# Patient Record
Sex: FEMALE | Race: WHITE | NOT HISPANIC OR LATINO | URBAN - METROPOLITAN AREA
[De-identification: names, ages, dates, MRNs, and addresses within clinical notes are randomized per-mention and may not be internally consistent; named-entity substitution may affect disease eponyms.]

---

## 2018-07-03 ENCOUNTER — NEW PATIENT (OUTPATIENT)
Dept: URBAN - METROPOLITAN AREA CLINIC 56 | Facility: CLINIC | Age: 68
End: 2018-07-03
Payer: MEDICARE

## 2018-07-03 DIAGNOSIS — E11.3293 DIABETES MELLITUS TYPE 2 WITH MILD NON-PROLIFERATI: ICD-10-CM

## 2018-07-03 DIAGNOSIS — H35.373 PUCKERING OF MACULA, BILATERAL: ICD-10-CM

## 2018-07-03 DIAGNOSIS — H43.813 VITREOUS DEGENERATION, BILATERAL: ICD-10-CM

## 2018-07-03 DIAGNOSIS — Z96.1 PRESENCE OF INTRAOCULAR LENS: ICD-10-CM

## 2018-07-03 PROCEDURE — 92250 FUNDUS PHOTOGRAPHY W/I&R: CPT | Performed by: OPTOMETRIST

## 2018-07-03 PROCEDURE — 92004 COMPRE OPH EXAM NEW PT 1/>: CPT | Performed by: OPTOMETRIST

## 2018-07-03 ASSESSMENT — VISUAL ACUITY
OS: 20/20
OD: 20/20

## 2018-07-03 ASSESSMENT — INTRAOCULAR PRESSURE
OS: 14
OD: 14

## 2018-07-03 ASSESSMENT — KERATOMETRY
OD: 44.09
OS: 43.70

## 2019-06-12 ENCOUNTER — FOLLOW UP ESTABLISHED (OUTPATIENT)
Dept: URBAN - METROPOLITAN AREA CLINIC 56 | Facility: CLINIC | Age: 69
End: 2019-06-12
Payer: MEDICARE

## 2019-06-12 DIAGNOSIS — H52.4 PRESBYOPIA: ICD-10-CM

## 2019-06-12 PROCEDURE — 92014 COMPRE OPH EXAM EST PT 1/>: CPT | Performed by: OPTOMETRIST

## 2019-06-12 PROCEDURE — 92134 CPTRZ OPH DX IMG PST SGM RTA: CPT | Performed by: OPTOMETRIST

## 2019-06-12 ASSESSMENT — VISUAL ACUITY
OS: 20/20
OD: 20/20

## 2019-06-12 ASSESSMENT — INTRAOCULAR PRESSURE
OS: 14
OD: 14

## 2019-06-12 ASSESSMENT — KERATOMETRY
OS: 43.90
OD: 44.12

## 2021-03-04 ENCOUNTER — FOLLOW UP ESTABLISHED (OUTPATIENT)
Dept: URBAN - METROPOLITAN AREA CLINIC 56 | Facility: CLINIC | Age: 71
End: 2021-03-04
Payer: MEDICARE

## 2021-03-04 DIAGNOSIS — E11.3393 DIABETES MELLITUS TYPE 2 WITH MODERATE NON-PROLIFE: ICD-10-CM

## 2021-03-04 DIAGNOSIS — Z79.84 LONG TERM (CURRENT) USE OF ORAL ANTIDIABETIC DRUGS: ICD-10-CM

## 2021-03-04 PROCEDURE — 92250 FUNDUS PHOTOGRAPHY W/I&R: CPT | Performed by: OPTOMETRIST

## 2021-03-04 PROCEDURE — 99214 OFFICE O/P EST MOD 30 MIN: CPT | Performed by: OPTOMETRIST

## 2021-03-04 ASSESSMENT — VISUAL ACUITY
OS: 20/25
OD: 20/25

## 2021-03-04 ASSESSMENT — KERATOMETRY
OD: 44.20
OS: 43.90

## 2021-03-04 ASSESSMENT — INTRAOCULAR PRESSURE
OD: 14
OS: 14

## 2021-04-21 ENCOUNTER — OFFICE VISIT (OUTPATIENT)
Dept: URBAN - METROPOLITAN AREA CLINIC 56 | Facility: CLINIC | Age: 71
End: 2021-04-21
Payer: MEDICARE

## 2021-04-21 DIAGNOSIS — H26.493 OTHER SECONDARY CATARACT, BILATERAL: ICD-10-CM

## 2021-04-21 DIAGNOSIS — E11.39 TYPE 2 DIABETES MELLITUS WITH OTHER DIABETIC OPHTHALMIC COMPLICATION: ICD-10-CM

## 2021-04-21 PROCEDURE — 92004 COMPRE OPH EXAM NEW PT 1/>: CPT | Performed by: OPHTHALMOLOGY

## 2021-04-21 PROCEDURE — 92235 FLUORESCEIN ANGRPH MLTIFRAME: CPT | Performed by: OPHTHALMOLOGY

## 2021-04-21 PROCEDURE — 92134 CPTRZ OPH DX IMG PST SGM RTA: CPT | Performed by: OPHTHALMOLOGY

## 2021-04-21 ASSESSMENT — INTRAOCULAR PRESSURE
OS: 13
OD: 12

## 2021-04-21 NOTE — IMPRESSION/PLAN
Impression: Type 2 diab w mild nonprlf diabetic rtnop w/o macular edema, bilateral: Q35.0289.  Plan: Mild NDPR with no DME OU - observe and review in 4 months, sooner PRN

## 2021-04-21 NOTE — IMPRESSION/PLAN
Impression: Type 2 diabetes mellitus with other diabetic ophthalmic complication: P04.87.  Plan: Stressed the importance compliance, blood glucose, and blood pressure control in preventing progression of retinopathy and/or maculopathy and potentially irreversible vision loss and blindness

## 2022-11-17 ENCOUNTER — OFFICE VISIT (OUTPATIENT)
Dept: URBAN - METROPOLITAN AREA CLINIC 56 | Facility: LOCATION | Age: 72
End: 2022-11-17
Payer: MEDICARE

## 2022-11-17 DIAGNOSIS — H43.813 VITREOUS DEGENERATION, BILATERAL: ICD-10-CM

## 2022-11-17 DIAGNOSIS — E11.3293 TYPE 2 DIAB W MILD NONPRLF DIABETIC RTNOP W/O MACULAR EDEMA, BILATERAL: Primary | ICD-10-CM

## 2022-11-17 DIAGNOSIS — H26.493 OTHER SECONDARY CATARACT, BILATERAL: ICD-10-CM

## 2022-11-17 DIAGNOSIS — H35.373 PUCKERING OF MACULA, BILATERAL: ICD-10-CM

## 2022-11-17 DIAGNOSIS — Z79.84 LONG TERM (CURRENT) USE OF ORAL ANTIDIABETIC DRUGS: ICD-10-CM

## 2022-11-17 PROCEDURE — 99214 OFFICE O/P EST MOD 30 MIN: CPT

## 2022-11-17 PROCEDURE — 92134 CPTRZ OPH DX IMG PST SGM RTA: CPT

## 2022-11-17 ASSESSMENT — VISUAL ACUITY
OS: 20/25
OD: 20/20

## 2022-11-17 ASSESSMENT — INTRAOCULAR PRESSURE
OS: 14
OD: 14

## 2022-11-17 ASSESSMENT — KERATOMETRY
OD: 43.95
OS: 44.01

## 2022-11-17 NOTE — IMPRESSION/PLAN
Impression: Type 2 diab w mild nonprlf diabetic rtnop w/o macular edema, bilateral: [id number]. Plan: Ed pt on clinical findings. DWP that closer follow-up is recommended based on today's findings. Mild non-proliferative diabetic retinopathy increases changes of vision reduction from fluctuating blood sugar levels. Reminded pt that ADA recommends target A1c of 7.0 or less to minimize risk of retinopathy as well as other systemic complications of DM. Advised pt to RTC if vision changes. Recommend DFE in 6 months w/ fundus photos and MAC OCT, sooner if vision changes. Advised pt to visit with PCP regularly, will send today's notes to PCP. No DME per OCT today

## 2024-04-22 ENCOUNTER — OFFICE VISIT (OUTPATIENT)
Dept: URBAN - METROPOLITAN AREA CLINIC 56 | Facility: LOCATION | Age: 74
End: 2024-04-22
Payer: MEDICARE

## 2024-04-22 DIAGNOSIS — Z79.84 LONG TERM (CURRENT) USE OF ORAL HYPOGLYCEMIC DRUGS: ICD-10-CM

## 2024-04-22 DIAGNOSIS — H35.373 PUCKERING OF MACULA, BILATERAL: ICD-10-CM

## 2024-04-22 DIAGNOSIS — Z96.1 PRESENCE OF INTRAOCULAR LENS: ICD-10-CM

## 2024-04-22 DIAGNOSIS — H04.123 DRY EYE SYNDROME OF BILATERAL LACRIMAL GLANDS: ICD-10-CM

## 2024-04-22 DIAGNOSIS — H52.4 PRESBYOPIA: ICD-10-CM

## 2024-04-22 DIAGNOSIS — H18.593 OTHER HEREDITARY CORNEAL DYSTROPHIES: ICD-10-CM

## 2024-04-22 DIAGNOSIS — E11.3293 DIABETES MELLITUS TYPE 2 WITH MILD NON-PROLIFERATIVE RETINOPATHY WITHOUT MACULAR EDEMA, BILATERAL: Primary | ICD-10-CM

## 2024-04-22 PROCEDURE — 92014 COMPRE OPH EXAM EST PT 1/>: CPT

## 2024-04-22 PROCEDURE — 92134 CPTRZ OPH DX IMG PST SGM RTA: CPT

## 2024-04-22 ASSESSMENT — VISUAL ACUITY
OS: 20/20
OD: 20/20

## 2024-04-22 ASSESSMENT — KERATOMETRY
OD: 44.25
OS: 44.13

## 2024-04-22 ASSESSMENT — INTRAOCULAR PRESSURE
OD: 16
OS: 16

## 2025-07-21 ENCOUNTER — OFFICE VISIT (OUTPATIENT)
Dept: URBAN - METROPOLITAN AREA CLINIC 56 | Facility: LOCATION | Age: 75
End: 2025-07-21
Payer: MEDICARE

## 2025-07-21 DIAGNOSIS — Z79.84 LONG TERM (CURRENT) USE OF ORAL HYPOGLYCEMIC DRUGS: ICD-10-CM

## 2025-07-21 DIAGNOSIS — H43.813 VITREOUS DEGENERATION, BILATERAL: ICD-10-CM

## 2025-07-21 DIAGNOSIS — H52.4 PRESBYOPIA: ICD-10-CM

## 2025-07-21 DIAGNOSIS — H35.373 PUCKERING OF MACULA, BILATERAL: ICD-10-CM

## 2025-07-21 DIAGNOSIS — H04.123 DRY EYE SYNDROME OF BILATERAL LACRIMAL GLANDS: ICD-10-CM

## 2025-07-21 DIAGNOSIS — Z96.1 PRESENCE OF INTRAOCULAR LENS: ICD-10-CM

## 2025-07-21 DIAGNOSIS — H18.593 OTHER HEREDITARY CORNEAL DYSTROPHIES: ICD-10-CM

## 2025-07-21 DIAGNOSIS — E11.3293 DIABETES MELLITUS TYPE 2 WITH MILD NON-PROLIFERATIVE RETINOPATHY WITHOUT MACULAR EDEMA, BILATERAL: Primary | ICD-10-CM

## 2025-07-21 PROCEDURE — 92134 CPTRZ OPH DX IMG PST SGM RTA: CPT

## 2025-07-21 PROCEDURE — 92014 COMPRE OPH EXAM EST PT 1/>: CPT

## 2025-07-21 ASSESSMENT — INTRAOCULAR PRESSURE
OS: 14
OD: 14

## 2025-07-21 ASSESSMENT — VISUAL ACUITY
OD: 20/20
OS: 20/20

## 2025-07-21 ASSESSMENT — KERATOMETRY
OS: 44.20
OD: 44.21